# Patient Record
Sex: FEMALE | Race: WHITE
[De-identification: names, ages, dates, MRNs, and addresses within clinical notes are randomized per-mention and may not be internally consistent; named-entity substitution may affect disease eponyms.]

---

## 2019-03-04 ENCOUNTER — HOSPITAL ENCOUNTER (INPATIENT)
Dept: HOSPITAL 92 - ERS | Age: 84
LOS: 5 days | Discharge: NURSING, REHAB OR LONG-TERM CARE | DRG: 280 | End: 2019-03-09
Attending: INTERNAL MEDICINE | Admitting: INTERNAL MEDICINE
Payer: MEDICARE

## 2019-03-04 VITALS — BODY MASS INDEX: 35.7 KG/M2

## 2019-03-04 DIAGNOSIS — F32.9: ICD-10-CM

## 2019-03-04 DIAGNOSIS — E11.22: ICD-10-CM

## 2019-03-04 DIAGNOSIS — Z95.1: ICD-10-CM

## 2019-03-04 DIAGNOSIS — Z79.82: ICD-10-CM

## 2019-03-04 DIAGNOSIS — G40.909: ICD-10-CM

## 2019-03-04 DIAGNOSIS — Z79.84: ICD-10-CM

## 2019-03-04 DIAGNOSIS — I48.0: ICD-10-CM

## 2019-03-04 DIAGNOSIS — I25.10: ICD-10-CM

## 2019-03-04 DIAGNOSIS — Z79.01: ICD-10-CM

## 2019-03-04 DIAGNOSIS — H40.9: ICD-10-CM

## 2019-03-04 DIAGNOSIS — I13.0: Primary | ICD-10-CM

## 2019-03-04 DIAGNOSIS — Z88.2: ICD-10-CM

## 2019-03-04 DIAGNOSIS — E78.5: ICD-10-CM

## 2019-03-04 DIAGNOSIS — Z88.8: ICD-10-CM

## 2019-03-04 DIAGNOSIS — Z88.0: ICD-10-CM

## 2019-03-04 DIAGNOSIS — D63.1: ICD-10-CM

## 2019-03-04 DIAGNOSIS — N18.3: ICD-10-CM

## 2019-03-04 DIAGNOSIS — Z88.1: ICD-10-CM

## 2019-03-04 DIAGNOSIS — I21.A1: ICD-10-CM

## 2019-03-04 DIAGNOSIS — Z79.899: ICD-10-CM

## 2019-03-04 DIAGNOSIS — I50.33: ICD-10-CM

## 2019-03-04 LAB
ALBUMIN SERPL BCG-MCNC: 3.7 G/DL (ref 3.4–4.8)
ALP SERPL-CCNC: 104 U/L (ref 40–150)
ALT SERPL W P-5'-P-CCNC: 23 U/L (ref 8–55)
ANION GAP SERPL CALC-SCNC: 17 MMOL/L (ref 10–20)
AST SERPL-CCNC: 17 U/L (ref 5–34)
BASOPHILS # BLD AUTO: 0.1 THOU/UL (ref 0–0.2)
BASOPHILS NFR BLD AUTO: 0.9 % (ref 0–1)
BILIRUB SERPL-MCNC: 0.3 MG/DL (ref 0.2–1.2)
BUN SERPL-MCNC: 28 MG/DL (ref 9.8–20.1)
CALCIUM SERPL-MCNC: 8.8 MG/DL (ref 7.8–10.44)
CHLORIDE SERPL-SCNC: 107 MMOL/L (ref 98–107)
CK MB SERPL-MCNC: 1.5 NG/ML (ref 0–6.6)
CK MB SERPL-MCNC: 5.6 NG/ML (ref 0–6.6)
CK SERPL-CCNC: 13 U/L (ref 29–168)
CO2 SERPL-SCNC: 19 MMOL/L (ref 23–31)
CREAT CL PREDICTED SERPL C-G-VRATE: 0 ML/MIN (ref 70–130)
EOSINOPHIL # BLD AUTO: 0.3 THOU/UL (ref 0–0.7)
EOSINOPHIL NFR BLD AUTO: 3.5 % (ref 0–10)
GLOBULIN SER CALC-MCNC: 2.9 G/DL (ref 2.4–3.5)
GLUCOSE SERPL-MCNC: 174 MG/DL (ref 83–110)
HGB BLD-MCNC: 10.5 G/DL (ref 12–16)
LYMPHOCYTES # BLD: 2.8 THOU/UL (ref 1.2–3.4)
LYMPHOCYTES NFR BLD AUTO: 34.2 % (ref 21–51)
MCH RBC QN AUTO: 30.7 PG (ref 27–31)
MCV RBC AUTO: 97.1 FL (ref 78–98)
MONOCYTES # BLD AUTO: 0.4 THOU/UL (ref 0.11–0.59)
MONOCYTES NFR BLD AUTO: 4.6 % (ref 0–10)
NEUTROPHILS # BLD AUTO: 4.7 THOU/UL (ref 1.4–6.5)
NEUTROPHILS NFR BLD AUTO: 56.7 % (ref 42–75)
PLATELET # BLD AUTO: 205 THOU/UL (ref 130–400)
POTASSIUM SERPL-SCNC: 4.5 MMOL/L (ref 3.5–5.1)
RBC # BLD AUTO: 3.41 MILL/UL (ref 4.2–5.4)
SODIUM SERPL-SCNC: 138 MMOL/L (ref 136–145)
WBC # BLD AUTO: 8.3 THOU/UL (ref 4.8–10.8)

## 2019-03-04 PROCEDURE — 93306 TTE W/DOPPLER COMPLETE: CPT

## 2019-03-04 PROCEDURE — 82553 CREATINE MB FRACTION: CPT

## 2019-03-04 PROCEDURE — 94640 AIRWAY INHALATION TREATMENT: CPT

## 2019-03-04 PROCEDURE — 71045 X-RAY EXAM CHEST 1 VIEW: CPT

## 2019-03-04 PROCEDURE — 94760 N-INVAS EAR/PLS OXIMETRY 1: CPT

## 2019-03-04 PROCEDURE — 80048 BASIC METABOLIC PNL TOTAL CA: CPT

## 2019-03-04 PROCEDURE — 82550 ASSAY OF CK (CPK): CPT

## 2019-03-04 PROCEDURE — 84484 ASSAY OF TROPONIN QUANT: CPT

## 2019-03-04 PROCEDURE — 36415 COLL VENOUS BLD VENIPUNCTURE: CPT

## 2019-03-04 PROCEDURE — 85025 COMPLETE CBC W/AUTO DIFF WBC: CPT

## 2019-03-04 PROCEDURE — 93005 ELECTROCARDIOGRAM TRACING: CPT

## 2019-03-04 PROCEDURE — 96374 THER/PROPH/DIAG INJ IV PUSH: CPT

## 2019-03-04 PROCEDURE — 90670 PCV13 VACCINE IM: CPT

## 2019-03-04 PROCEDURE — 83880 ASSAY OF NATRIURETIC PEPTIDE: CPT

## 2019-03-04 PROCEDURE — 90471 IMMUNIZATION ADMIN: CPT

## 2019-03-04 PROCEDURE — G0009 ADMIN PNEUMOCOCCAL VACCINE: HCPCS

## 2019-03-04 PROCEDURE — 80053 COMPREHEN METABOLIC PANEL: CPT

## 2019-03-04 PROCEDURE — 36416 COLLJ CAPILLARY BLOOD SPEC: CPT

## 2019-03-04 NOTE — RAD
SINGLE VIEW CHEST:

 

Date:  03/04/19 

 

COMPARISON:  

02/07/19. 

 

HISTORY:  

Chest palpitations and productive cough. 

 

FINDINGS:

Single view of the chest shows an enlarged but stable cardiomediastinal silhouette. The patient is st
atus post sternotomy. There is no evidence of consolidation, mass, or pleural effusion. 

 

IMPRESSION: 

Cardiomegaly without evidence of acute cardiopulmonary disease. 

 

 

POS: [facility]

## 2019-03-05 LAB
ANION GAP SERPL CALC-SCNC: 13 MMOL/L (ref 10–20)
BASOPHILS # BLD AUTO: 0.1 THOU/UL (ref 0–0.2)
BASOPHILS NFR BLD AUTO: 1 % (ref 0–1)
BUN SERPL-MCNC: 26 MG/DL (ref 9.8–20.1)
CALCIUM SERPL-MCNC: 8.4 MG/DL (ref 7.8–10.44)
CHLORIDE SERPL-SCNC: 106 MMOL/L (ref 98–107)
CO2 SERPL-SCNC: 23 MMOL/L (ref 23–31)
CREAT CL PREDICTED SERPL C-G-VRATE: 38 ML/MIN (ref 70–130)
EOSINOPHIL # BLD AUTO: 0.2 THOU/UL (ref 0–0.7)
EOSINOPHIL NFR BLD AUTO: 2.7 % (ref 0–10)
GLUCOSE SERPL-MCNC: 155 MG/DL (ref 83–110)
HGB BLD-MCNC: 9.6 G/DL (ref 12–16)
LYMPHOCYTES # BLD: 1.3 THOU/UL (ref 1.2–3.4)
LYMPHOCYTES NFR BLD AUTO: 20.5 % (ref 21–51)
MCH RBC QN AUTO: 31 PG (ref 27–31)
MCV RBC AUTO: 95.1 FL (ref 78–98)
MONOCYTES # BLD AUTO: 0.4 THOU/UL (ref 0.11–0.59)
MONOCYTES NFR BLD AUTO: 6.1 % (ref 0–10)
NEUTROPHILS # BLD AUTO: 4.3 THOU/UL (ref 1.4–6.5)
NEUTROPHILS NFR BLD AUTO: 69.8 % (ref 42–75)
PLATELET # BLD AUTO: 176 THOU/UL (ref 130–400)
POTASSIUM SERPL-SCNC: 4.1 MMOL/L (ref 3.5–5.1)
RBC # BLD AUTO: 3.11 MILL/UL (ref 4.2–5.4)
SODIUM SERPL-SCNC: 138 MMOL/L (ref 136–145)
TROPONIN I SERPL DL<=0.01 NG/ML-MCNC: 4.28 NG/ML (ref ?–0.03)
WBC # BLD AUTO: 6.2 THOU/UL (ref 4.8–10.8)

## 2019-03-05 RX ADMIN — GUAIFENESIN AND DEXTROMETHORPHAN PRN ML: 100; 10 SYRUP ORAL at 15:33

## 2019-03-05 RX ADMIN — ASPIRIN SCH MG: 81 TABLET ORAL at 09:05

## 2019-03-05 RX ADMIN — DOCUSATE SODIUM 50 MG AND SENNOSIDES 8.6 MG PRN TAB: 8.6; 5 TABLET, FILM COATED ORAL at 00:50

## 2019-03-05 RX ADMIN — GUAIFENESIN AND DEXTROMETHORPHAN PRN ML: 100; 10 SYRUP ORAL at 08:29

## 2019-03-05 NOTE — PDOC.PN
- Subjective


Encounter Start Date: 03/05/19


Encounter Start Time: 11:00





Pt seen for followup re: diastolic CHF exacerbation.  Says she feels okay.





- Objective


Resuscitation Status - Order Detail:





03/04/19 16:34


Resuscitation Status Routine 


   Resuscitation Status: DNAR: NO Resuscitation


   Discussed with: patient








MAR Reviewed: Yes


Vital Signs & Weight: 


 Vital Signs (12 hours)











  Temp Pulse Pulse Pulse Resp BP BP


 


 03/05/19 15:37  97.1 F L  65    16  


 


 03/05/19 11:05  97.9 F     16  


 


 03/05/19 10:26    65  66   128/60  134/57 L


 


 03/05/19 10:25    65  66   128/60  134/57 L


 


 03/05/19 08:07       


 


 03/05/19 08:06   104 H    16  


 


 03/05/19 08:00  97.1 F L      














  BP Pulse Ox


 


 03/05/19 15:37  168/72 H  100


 


 03/05/19 11:05   98


 


 03/05/19 10:26  


 


 03/05/19 10:25  


 


 03/05/19 08:07   99


 


 03/05/19 08:06  


 


 03/05/19 08:00  








 Weight











Weight                         195 lb 8.8 oz














Result Diagrams: 


 03/05/19 04:58





 03/05/19 04:58


Additional Labs: 


 Accuchecks











  03/05/19





  16:44


 


POC Glucose  143 H











EKG Reviewed by me: Yes (Tele:  [facility])





Phys Exam





- Physical Examination


Obese


HEENT: moist MMs, sclera anicteric, oral pharynx no lesions, 2+ tonsils


Respiratory: wheezing present


Cardiovascular: RRR, no rub


S1, S2


Gastrointestinal: soft, non-tender, positive bowel sounds


distention


Musculoskeletal: edema present


Neurological: moves all 4 limbs


Psychiatric: normal affect, A&O x 3





Dx/Plan


(1) Acute on chronic diastolic CHF (congestive heart failure), NYHA class 3


Code(s): I50.33 - ACUTE ON CHRONIC DIASTOLIC (CONGESTIVE) HEART FAILURE   Status

: Acute   Comment: continue diuretics   





(2) Demand ischemia


Code(s): I24.8 - OTHER FORMS OF ACUTE ISCHEMIC HEART DISEASE   Status: Acute   

Comment: No chest pain, cardiology service consulted   





(3) DM2 (diabetes mellitus, type 2)


Status: Chronic   Comment: continue accuchecks, insulin sliding scale   





(4) HTN (hypertension)


Code(s): I10 - ESSENTIAL (PRIMARY) HYPERTENSION   Status: Chronic   


Qualifiers: 


   Hypertension type: essential hypertension   Qualified Code(s): I10 - 

Essential (primary) hypertension   


Comment: controlled   





(5) CKD stage 4 secondary to hypertension


Code(s): I12.9 - HYPERTENSIVE CHRONIC KIDNEY DISEASE W STG 1-4/UNSP CHR KDNY; 

N18.4 - CHRONIC KIDNEY DISEASE, STAGE 4 (SEVERE)   Status: Acute   





(6) CKD stage 4 due to type 2 diabetes mellitus


Code(s): E11.22 - TYPE 2 DIABETES MELLITUS W DIABETIC CHRONIC KIDNEY DISEASE; 

N18.4 - CHRONIC KIDNEY DISEASE, STAGE 4 (SEVERE)   Status: Chronic   Comment: 

now in CKD stage 3   





- Plan





* .








Review of Systems





- Review of Systems


Constitutional: negative: fever, chills, sweats, weakness, malaise


Respiratory: Cough, Sputum.  negative: Dry, Shortness of Breath, Hemoptysis, 

SOB with Excertion, Pleuritic Pain, Wheezing


Cardiovascular: negative: chest pain, palpitations, orthopnea, paroxysmal 

nocturnal dyspnea, edema, light headedness


Gastrointestinal: negative: Nausea, Vomiting, Abdominal Pain, Diarrhea, 

Constipation, Melena, Hematochezia


Genitourinary: negative: Dysuria, Frequency, Incontinence, Hematuria, Retention





- Medications/Allergies


Allergies/Adverse Reactions: 


 Allergies











Allergy/AdvReac Type Severity Reaction Status Date / Time


 


codeine Allergy Intermediate  Verified 03/05/19 06:56


 


Penicillins Allergy Intermediate  Verified 03/05/19 06:56


 


Sulfa (Sulfonamide Allergy Intermediate  Verified 03/05/19 06:56





Antibiotics)     


 


ciprofloxacin [From Cipro] Allergy   Verified 03/05/19 06:56


 


phenytoin [From Dilantin] Allergy   Verified 03/05/19 06:56


 


vancomycin Allergy  Hives Verified 03/05/19 06:56











Medications: 


 Current Medications





Acetaminophen (Tylenol)  650 mg PO Q4H PRN


   PRN Reason: Headache/Fever/Mild Pain (1-3)


Albuterol/Ipratropium (Duoneb)  3 ml NEB BID-RT [facility]


   Last Admin: 03/05/19 08:06 Dose:  3 ml


Aspirin (Ecotrin)  81 mg PO DAILY [facility]


   Last Admin: 03/05/19 09:05 Dose:  81 mg


Digoxin (Lanoxin)  0.125 mg PO DAILY [facility]


   Last Admin: 03/05/19 08:29 Dose:  0.125 mg


Dronedarone (Multaq)  400 mg PO BID-[facility]


   Last Admin: 03/05/19 08:29 Dose:  400 mg


Escitalopram Oxalate (Lexapro)  10 mg PO DAILY [facility]


   Last Admin: 03/05/19 08:29 Dose:  10 mg


Furosemide (Lasix)  40 mg SLOW IVP DAILY [facility]


   Last Admin: 03/05/19 08:30 Dose:  40 mg


Glyburide (Micronase)  2.5 mg PO QAM-[facility]


   Last Admin: 03/05/19 08:29 Dose:  2.5 mg


Guaifenesin/Dextromethorphan (Robitussin Dm)  15 ml PO Q4H PRN


   PRN Reason: Cough


   Last Admin: 03/05/19 15:33 Dose:  15 ml


Hydralazine HCl (Apresoline)  25 mg PO TID [facility]


   Last Admin: 03/05/19 15:31 Dose:  25 mg


Hydralazine HCl (Apresoline)  5 mg SLOW IVP Q4H PRN


   PRN Reason: SBP GREATER THAN 160


Labetalol HCl (Normodyne)  10 mg SLOW IVP Q4H PRN


   PRN Reason: SBP Greater Than 180


Melatonin (Melatonin)  3 mg PO HS PRN


   PRN Reason: Insomnia


Metoprolol Tartrate (Lopressor)  100 mg PO BID [facility]


   Last Admin: 03/05/19 08:28 Dose:  100 mg


Ondansetron HCl (Zofran)  4 mg IVP Q6H PRN


   PRN Reason: Nausea/Vomiting


Phenobarbital (Phenobarbital)  64.8 mg PO BID [facility]


   Last Admin: 03/05/19 10:03 Dose:  64.8 mg


Rosuvastatin Calcium (Crestor)  40 mg PO HS [facility]


   Last Admin: 03/04/19 21:55 Dose:  40 mg


Senna/Docusate Sodium (Senokot S)  2 tab PO BID PRN


   PRN Reason: Constipation


   Last Admin: 03/05/19 00:50 Dose:  2 tab

## 2019-03-05 NOTE — CON
DATE OF CONSULTATION:  



REASON FOR CONSULTATION:  Congestive heart failure.



PRIMARY CARDIOLOGIST:  Dr. Eric Nickerson.



HISTORY OF PRESENT ILLNESS:  Ms. Arthur is a pleasant 85-year-old woman, who has

been seen and evaluated by Dr. Ramez Hart and Dr. Eric Nickerson.  Primary

cardiologist is Dr. Ramez Hart, although the patient states it was Dr. Eric Nickerson. 



Ms. Arthur has a previous history of diastolic dysfunction.  She states she

recently presented with a mucus.  No shortness of breath present.  No chest pain or

other associated symptoms noted. 



PAST MEDICAL HISTORY:  

1. Diabetes mellitus.

2. Hyperlipidemia.

3. Hypertension.

4. CAD.

5. Previous seizure disorder.

6. Brain abscess.



HOME MEDICATIONS:  Include;

1. Benicar.

2. Plavix.

3. Aspirin.

4. Crestor.

5. Metoprolol.

6. Phenobarbital.

7. Metformin.

8. Glipizide.

9. Januvia.

10. Myrbetriq.

11. Lasix.



ALLERGIES:  CODEINE, CIPRO, DILANTIN, TEGRETOL, VANCOMYCIN.



SOCIAL HISTORY:  No current tobacco or alcohol use.



REVIEW OF SYSTEMS:  A 10-point review of systems is reviewed as above, otherwise

negative. 



PHYSICAL EXAMINATION:

VITAL SIGNS:  Blood pressure 160/72, pulse 65, temperature 97.1. 

GENERAL:  Patient is a pleasant 85-year-old who is in no acute distress.  The

patient appears their stated age. 

NEUROLOGIC:  The patient is alert and oriented x3 with no focal neurologic deficits. 

HEENT:  Sclerae without icterus.  Mouth has moist mucous membranes with normal

pallor. 

NECK:  No JVD.  Carotid upstroke brisk.  No bruits bilaterally. 

LUNGS:  Clear to auscultation with unlabored respirations. 

BACK:  No scoliosis or kyphosis. 

CARDIAC:  Regular rate and rhythm with normal S1 and S2.  No S3 or S4 noted.  No

significant rubs, murmurs, thrills, or gallops noted throughout the precordium.  PMI

is not displaced.  There is no parasternal heave. 

ABDOMEN:  Soft, nontender, nondistended.  No peritoneal signs present.  No

hepatosplenomegaly.  No abnormal striae. 

EXTREMITIES:  2+ femoral and 2+ dorsalis pedis pulses.  No cyanosis, clubbing, or

edema. 

SKIN:  No gross abnormalities.



PERTINENT LABORATORY DATA:  Hemoglobin 9.6, white blood cell count 6.2.  Peak

troponin 4.2.  Creatinine 1.53 with a GFR of 32.  BNP of 903. 



Chest x-ray, cardiomegaly.



IMPRESSION:  

1. Elevated troponin.

2. Bronchitis versus pneumonia.

3. Diastolic dysfunction.

4. Coronary artery disease, status post bypass surgery.



RECOMMENDATIONS:  It is a certainly difficult situation for Ms. Arthur.  She does

have a previous history of CAD, status post bypass surgery and elevated troponin,

although no current symptoms suggesting angina.  At this point, I would recommend

medical therapy.  Her creatinine is elevated.  We will add Lovenox x1 dose.  We will

recommend echo with Doppler.  May need cardiac disposition prior to discharge, given

elevated troponin.  Further recommendations per Dr. Ramez Hart in a.m. 







Job ID:  [id number]

## 2019-03-05 NOTE — HP
PRIMARY CARE PHYSICIAN:  Crispin Powell MD.



CHIEF COMPLAINT:  "I woke up spitting up phlegm."



HISTORY OF PRESENT ILLNESS:  Mrs. Arthur is a delightful 85-year-old female,

familiar to the hospitalist service from a recent hospital stay 2019 
through

2019, at which time she was admitted with bilateral pneumonia, physical

deconditioning, and found to have evidence of atrial tachyarrhythmias.  She was

ultimately discharged on anticoagulation with Eliquis, as well as rate/rhythm

control with digoxin, Multaq, and metoprolol.  She has been on the low dose 
diuretic

regimen at [facility], torsemide 5 mg p.o. twice daily.

She presents on this occasion with interval worsening of phlegm production.  In

speaking with the patient, and later with her daughter, Zafar Bowman, her blood

pressure has been running high throughout the week.  Additionally, she has been

having increasing lower extremity edema.  She denies any fever, chills, chest 
pain,

or chest pressure.  She completed her antibiotic for pneumonia as prescribed 
upon

last hospital discharge.  Her blood sugars have been under fair control.  In the

emergency department, she received 20 mg of IV Lasix, as well as a DuoNeb.  At 
the

time of my evaluation, several hours later, she reports good urine output, with

continued phlegm production.  She tends to have more difficulties when lying 
flat.

Additionally, cardiac abnormality regarding laboratory work noted, with cardiac

biomarkers abnormal on third draw, troponin I of 1.150.  The patient seen and

evaluated in the emergency department, at the time of my evaluation, she is 
holding for

in-hospital placement. 



During her previous hospital stay, seen and evaluated by Dr. Nickerson from

Cardiology in regard to paroxysmal atrial fibrillation, atrial tachycardia.

Ultimately returned to sinus rhythm, with ongoing administration of Multaq,

beta-blocker, digoxin.  Additionally, discharged to [facility] on

Eliquis 2.5 mg p.o. twice daily. 



The patient denies any nausea or vomiting.  Describes her appetite is "decent."

Denies feeling short of breath.  She does not ambulate, transfers to the 
wheelchair

with assistance for mobility. 



PAST MEDICAL HISTORY:  

1. Hypertension.

2. Type 2 diabetes.

3. Dyslipidemia.

4. Coronary artery disease status post previous coronary artery bypass grafting

greater than 10 years ago. 

5. History of seizure disorder, stable on phenobarbital twice daily.

6. Glaucoma.

7. Recent hospital stay for pneumonia, 2019.

8. Diastolic congestive heart failure, with ejection fraction of 50% to 55%, 
based

on echocardiogram, 2019. 

9. Atrial fibrillation, paroxysmal, noted during in-hospital stay, 2019.

10. Depression.



ALLERGIES:  CIPROFLOXACIN, CODEINE, SULFA ANTIBIOTICS, PENICILLIN, DILANTIN,

TEGRETOL, AND VANCOMYCIN. 



CURRENT MEDICATIONS:  

1. Aspirin 81 mg p.o. once daily.

2. Digoxin 0.125 mg p.o. once daily.

3. Glyburide 2.5 mg p.o. once daily.

4. Lexapro 10 mg p.o. once daily.

5. Dronedarone 400 mg p.o. twice daily.

6. Eliquis 2.5 mg p.o. twice daily.

7. Metoprolol tartrate 100 mg p.o. b.i.d.

8. Phenobarbital 64.8 mg p.o. b.i.d.

9. Torsemide 5 mg p.o. b.i.d.

10. Hydralazine 25 mg p.o. t.i.d.

11. Crestor 40 mg p.o. at bedtime.

12. Fesoterodine 8 mg p.o. at bedtime.

13. Melatonin 3 mg p.o. at bedtime.



FAMILY HISTORY:  Father and grandfather  with diabetes and hypertension.



SOCIAL HISTORY:  The patient is .  She has been staying at [facility] since her last hospital discharge.  She is a lifelong nonsmoker, does 
not

use alcohol.  She has previously worked in the [employer].  I spoke with her about code status, she confirms do not attempt

resuscitation status.  Her daughter confirms the same. 



REVIEW OF SYSTEMS:  Full review of systems reviewed, addressed, negative except

otherwise as mentioned. 



PHYSICAL EXAMINATION:

VITAL SIGNS:  Blood pressure 170/74, heart rate 91, respiratory rate 18, 
temperature

97.7. 

GENERAL:  Frail, elderly, pleasant female, sitting up in bed.  Able to give a 
fair

history. 

HEENT:  Pupils are reactive to light bilaterally.  No conjunctival injection.  
No

scleral icterus.  Extraocular movements are intact.  Oropharynx, mucous 
membranes

are moist, no thrush present. 

NECK:  Supple.  Full range of motion.  No jugular venous distention. 

CHEST:  Crackles in the lower lobes bilaterally, upper fields with some 
occasional

expiratory wheezing. 

HEART:  Regular rate and rhythm, without distinct murmur, rub, or gallop. 

ABDOMEN:  Soft, nontender, nondistended without rebound or guarding. 

EXTREMITIES:  Edema is present, 2+ bilateral lower extremities.  Vascular 
palpable

dorsalis pedis and posterior tibial pulses 1+ bilaterally. 

SKIN:  Without erythema/exudate/rash. 

NEUROLOGIC:  She is able to move all extremities bilaterally to command.  No 
focal

neurological deficit. 



LABORATORY/DATA REVIEW:  Chest x-ray personally reviewed, interpretation also

reviewed.  Mild cardiomegaly, with question of overlying pulmonary vascular

congestion present, personally reviewed.  EKG personally reviewed, normal sinus

rhythm, 99 beats per minute.  Some lateral T-wave changes present noted. 



Bicarbonate is 19, BUN 28, creatinine 1.6, glucose 174.  CK-MB is normal.  
Troponin

1.150 on third set cardiac biomarkers.  BNP elevated at 903.  White blood cell 
count

normal at 8.3, hemoglobin 10.5, hematocrit 33.1, platelet count of 205.  Case

discussed by phone with Dr. Hart, on-call for Cardiology.  Management discussed,

request made for consultation, 2019, with Dr. Nickerson, who took care of 
the

patient during her last hospital stay. 



ADMITTING DIAGNOSES:  

1. Decompensated diastolic congestive heart failure in the context of recent

pneumonia, persistent hypertension, fluid retention. 

2. Recent history of bilateral pneumonia, status post completion of antibiotic

therapy, without evidence of inflammatory process at this time. 

3. Elevated troponin, compatible with demand ischemia versus non-ST elevation

myocardial infarction. 

4. Type 2 diabetes.

5. Essential hypertension, suboptimal control 

6.  History of coronary artery disease, prior CABG.

7. Seizure disorder, presently stable.

8. Chronic kidney disease, stage 3.



PLAN/RECOMMENDATIONS:  

1. Cardiology - additional troponin ordered for the morning.  Afterload 
reduction,

to include her home metoprolol tartrate 100 mg p.o. twice per day, as well as

hydralazine 25 mg p.o. three times daily.  We will also provide p.r.n. afterload

reduction pending her response to therapy.  Diuretics provided in the emergency

department, Lasix 40 mg IV daily ordered, beginning tomorrow.  Check daily 
weights,

follow clinically.  Cardiology consultation with Dr. Nickerson, for 2019.  
The

patient is presently in normal sinus rhythm, in contrast her most recent 
hospital

stay during which time atrial tachyarrhythmias were prominent.  Continue digoxin
,

Dronedarone, as well as full anticoagulation with Eliquis.  Continuation of home

aspirin in the context of previous coronary artery disease.  We will continue 
home

Crestor.  The patient may require higher maintenance dose of diuretic, has been 
on

torsemide 5 mg p.o. twice daily. 

2. Infectious Disease - no indication for antibiotics at this time.

3. Deep venous thrombosis prophylaxis - the patient fully anticoagulated with

Eliquis. 

4. Endocrine - Accu-Cheks/sliding scale/glyburide per home list.

5. Code status - do not attempt resuscitation.

6. Depression - continue Lexapro 10 mg p.o. once daily.

7. Neurology - seizure disorder - stable, continue phenobarbital 64.8 mg p.o. 
twice 

daily. 

Given her age and comorbidities, she is high risk.  I spoke with her daughter 
at the 

bedside in the emergency department, explained the reason for her admission, 
questions answered.  No futher issues at the end of our interview.







Job ID:  [id number]



MTDD

## 2019-03-06 LAB
ANION GAP SERPL CALC-SCNC: 14 MMOL/L (ref 10–20)
BASOPHILS # BLD AUTO: 0 THOU/UL (ref 0–0.2)
BASOPHILS NFR BLD AUTO: 0.6 % (ref 0–1)
BUN SERPL-MCNC: 27 MG/DL (ref 9.8–20.1)
CALCIUM SERPL-MCNC: 8.3 MG/DL (ref 7.8–10.44)
CHLORIDE SERPL-SCNC: 105 MMOL/L (ref 98–107)
CO2 SERPL-SCNC: 22 MMOL/L (ref 23–31)
CREAT CL PREDICTED SERPL C-G-VRATE: 33 ML/MIN (ref 70–130)
EOSINOPHIL # BLD AUTO: 0.3 THOU/UL (ref 0–0.7)
EOSINOPHIL NFR BLD AUTO: 4.7 % (ref 0–10)
GLUCOSE SERPL-MCNC: 198 MG/DL (ref 83–110)
HGB BLD-MCNC: 9.8 G/DL (ref 12–16)
LYMPHOCYTES # BLD: 1.8 THOU/UL (ref 1.2–3.4)
LYMPHOCYTES NFR BLD AUTO: 27.8 % (ref 21–51)
MCH RBC QN AUTO: 30.7 PG (ref 27–31)
MCV RBC AUTO: 94.5 FL (ref 78–98)
MONOCYTES # BLD AUTO: 0.4 THOU/UL (ref 0.11–0.59)
MONOCYTES NFR BLD AUTO: 6.9 % (ref 0–10)
NEUTROPHILS # BLD AUTO: 3.8 THOU/UL (ref 1.4–6.5)
NEUTROPHILS NFR BLD AUTO: 60 % (ref 42–75)
PLATELET # BLD AUTO: 172 THOU/UL (ref 130–400)
POTASSIUM SERPL-SCNC: 4.1 MMOL/L (ref 3.5–5.1)
RBC # BLD AUTO: 3.18 MILL/UL (ref 4.2–5.4)
SODIUM SERPL-SCNC: 137 MMOL/L (ref 136–145)
WBC # BLD AUTO: 6.3 THOU/UL (ref 4.8–10.8)

## 2019-03-06 RX ADMIN — ASPIRIN SCH MG: 81 TABLET ORAL at 09:32

## 2019-03-06 RX ADMIN — GUAIFENESIN AND DEXTROMETHORPHAN PRN ML: 100; 10 SYRUP ORAL at 21:16

## 2019-03-06 NOTE — PDOC.CTH
Cardiology Progress Note





- Subjective





The pt seen and examined.  No overnight events.  No cardiac complaints.  She is 

on 1.5 LNC.  She denied SOB. 





- Objective


 Vital Signs











  Temp Pulse Pulse Pulse Resp BP BP


 


 03/06/19 16:49   64     161/70 H 


 


 03/06/19 16:00  97.4 F L  64    16  


 


 03/06/19 13:52    63  66    170/74 H


 


 03/06/19 12:00  96.1 F L  66    16  


 


 03/06/19 09:34   83     140/75 


 


 03/06/19 09:32   83     


 


 03/06/19 08:00  98.5 F  73    18  


 


 03/06/19 07:11   68    16  














  BP BP BP Pulse Ox


 


 03/06/19 16:49    


 


 03/06/19 16:00   161/70 H   95


 


 03/06/19 13:52  160/70 H   


 


 03/06/19 12:00    177/79 H  94 L


 


 03/06/19 09:34    


 


 03/06/19 09:32    


 


 03/06/19 08:00   140/75   98


 


 03/06/19 07:11     96








 











Weight                         192 lb 8 oz














 











 03/05/19 03/06/19 03/07/19





 06:59 06:59 06:59


 


Intake Total  1504 


 


Output Total  220 


 


Balance  1284 














- Physical Examination


General/Neuro: alert & oriented x3


Neck: no JVD present


Lungs: other: (diminished at bases)


Heart: RRR


Abdomen: soft


Extremities: other: (no edema)





- Telemetry


Telemetry Rhythm: SR





- Labs


Result Diagrams: 


 03/06/19 08:55





 03/06/19 08:55


 Troponin/CKMB











CK-MB (CK-2)  5.6 ng/mL (0-6.6)   03/04/19  12:27    


 


Troponin I  4.284 ng/mL (< 0.028)  H*  03/05/19  04:58    














- Assessment/Plan





1. Acute on chronic diastolic HF - stable with Lasix 40mg IV daily; on 

Bblocker. Not on ACE/ARB 2/2 hx of CKD.


2. Elevated trop - possible 2/2 demand ischemia; Echo is ordered; Asymptomatic


3. Parox Afib - remains in SR; on Multaq and BBlocker; resume Eliquis 2.5mg BID 

from tonight.


4. HTN - start Norvasc 5mg from this PM


5. 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

, and statin


6. CKD stage 4 - cont. to monitor


7. DM type 2 - 


8. Anemia - unchanged


9. hx of seizure disorder - 


MAR reviewed





pt. seen and patrick;l. by me. I agree with the A/P by the NP. She is comfortable 

and denies SOB or chest pain. The cardiac enzymes have increased. I spoke to 

the daughter and we agree that even if this is a small NSTEMI that we do not 

plan to proceed with a cardiac cath . She is asymptomatic at the present. If 

the BP remains elevated then I will add nitrates.





Review of Systems





- Review of Systems


Constitutional: reports: weakness


EENTM: reports: no symptoms reported


Respiratory: reports: no symptoms reported


Cardiac (ROS): reports: no symptoms reported


ABD/GI: reports: no symptoms reported


: reports: no symptoms reported


Musculoskeletal: reports: no symptoms reported

## 2019-03-06 NOTE — PDOC.PN
- Subjective


Encounter Start Date: 03/06/19


Encounter Start Time: 08:20





Pt seen for followup re:  diastolic CHF exacerbation.  Says she feels better.





- Objective


Resuscitation Status - Order Detail:





03/04/19 16:34


Resuscitation Status Routine 


   Resuscitation Status: DNAR: NO Resuscitation


   Discussed with: patient








MAR Reviewed: Yes


Vital Signs & Weight: 


 Vital Signs (12 hours)











  Temp Pulse Pulse Pulse Resp BP BP


 


 03/06/19 13:52    63  66    170/74 H


 


 03/06/19 09:34   83     140/75 


 


 03/06/19 09:32   83     


 


 03/06/19 08:00  98.5 F  73    18  


 


 03/06/19 07:11   68    16  














  BP BP Pulse Ox


 


 03/06/19 13:52  160/70 H  


 


 03/06/19 09:34   


 


 03/06/19 09:32   


 


 03/06/19 08:00   140/75  98


 


 03/06/19 07:11    96








 Weight











Weight                         192 lb 8 oz














I&O: 


 











 03/05/19 03/06/19 03/07/19





 06:59 06:59 06:59


 


Intake Total  1504 


 


Output Total  220 


 


Balance  1284 











Result Diagrams: 


 03/06/19 08:55





 03/06/19 08:55


Additional Labs: 


 Accuchecks











  03/06/19 03/06/19 03/05/19





  11:01 05:56 20:13


 


POC Glucose  185 H  145 H  175 H














  03/05/19





  16:44


 


POC Glucose  143 H











EKG Reviewed by me: Yes (Tele:  NSR)





Phys Exam





- Physical Examination


Obese


HEENT: moist MMs, sclera anicteric, oral pharynx no lesions, 2+ tonsils


Neck: no nodes, no JVD, supple, full ROM


Jose crackles


Cardiovascular: RRR, no rub


S1, S2


Gastrointestinal: soft, non-tender, positive bowel sounds


distended


Musculoskeletal: edema present


Neurological: moves all 4 limbs


Psychiatric: normal affect





Dx/Plan


(1) Acute on chronic diastolic CHF (congestive heart failure), NYHA class 3


Code(s): I50.33 - ACUTE ON CHRONIC DIASTOLIC (CONGESTIVE) HEART FAILURE   Status

: Acute   Comment: Improving, continue IV furosemide   





(2) Demand ischemia


Code(s): I24.8 - OTHER FORMS OF ACUTE ISCHEMIC HEART DISEASE   Status: Acute   

Comment: appreciate cardiology service input.  Check 2D echo.   





(3) DM2 (diabetes mellitus, type 2)


Status: Chronic   Comment: Improved control   





(4) HTN (hypertension)


Code(s): I10 - ESSENTIAL (PRIMARY) HYPERTENSION   Status: Chronic   


Qualifiers: 


   Hypertension type: essential hypertension   Qualified Code(s): I10 - 

Essential (primary) hypertension   


Comment: controlled   





(5) CKD stage 4 due to type 2 diabetes mellitus


Code(s): E11.22 - TYPE 2 DIABETES MELLITUS W DIABETIC CHRONIC KIDNEY DISEASE; 

N18.4 - CHRONIC KIDNEY DISEASE, STAGE 4 (SEVERE)   Status: Chronic   Comment: 

now in CKD stage 3   





- Plan





* .








Review of Systems





- Review of Systems


Constitutional: negative: fever, chills, sweats, weakness, malaise


Respiratory: Cough, SOB with Excertion, Sputum.  negative: Dry, Shortness of 

Breath, Hemoptysis, Pleuritic Pain, Wheezing


Cardiovascular: edema.  negative: chest pain, palpitations, orthopnea, 

paroxysmal nocturnal dyspnea, light headedness


Gastrointestinal: negative: Nausea, Vomiting, Abdominal Pain, Diarrhea, 

Constipation, Melena, Hematochezia


Genitourinary: negative: Dysuria, Frequency, Incontinence, Hematuria, Retention





- Medications/Allergies


Allergies/Adverse Reactions: 


 Allergies











Allergy/AdvReac Type Severity Reaction Status Date / Time


 


codeine Allergy Intermediate  Verified 03/05/19 06:56


 


Penicillins Allergy Intermediate  Verified 03/05/19 06:56


 


Sulfa (Sulfonamide Allergy Intermediate  Verified 03/05/19 06:56





Antibiotics)     


 


ciprofloxacin [From Cipro] Allergy   Verified 03/05/19 06:56


 


phenytoin [From Dilantin] Allergy   Verified 03/05/19 06:56


 


vancomycin Allergy  Hives Verified 03/05/19 06:56











Medications: 


 Current Medications





Acetaminophen (Tylenol)  650 mg PO Q4H PRN


   PRN Reason: Headache/Fever/Mild Pain (1-3)


Albuterol/Ipratropium (Duoneb)  3 ml NEB BID-[facility]


   Last Admin: 03/06/19 07:11 Dose:  3 ml


Aspirin (Ecotrin)  81 mg PO DAILY [facility]


   Last Admin: 03/06/19 09:32 Dose:  81 mg


Digoxin (Lanoxin)  0.125 mg PO DAILY [facility]


   Last Admin: 03/06/19 09:32 Dose:  0.125 mg


Diphenhydramine HCl (Benadryl)  25 mg PO Q6H PRN


   PRN Reason: Itching


Dronedarone (Multaq)  400 mg PO BID-[facility]


   Last Admin: 03/06/19 09:32 Dose:  400 mg


Escitalopram Oxalate (Lexapro)  10 mg PO DAILY [facility]


   Last Admin: 03/06/19 09:32 Dose:  10 mg


Furosemide (Lasix)  40 mg SLOW IVP DAILY [facility]


   Last Admin: 03/06/19 09:34 Dose:  40 mg


Glyburide (Micronase)  2.5 mg PO QAM-[facility]


   Last Admin: 03/06/19 09:32 Dose:  2.5 mg


Guaifenesin/Dextromethorphan (Robitussin Dm)  15 ml PO Q4H PRN


   PRN Reason: Cough


   Last Admin: 03/05/19 15:33 Dose:  15 ml


Hydralazine HCl (Apresoline)  25 mg PO TID [facility]


   Last Admin: 03/06/19 09:34 Dose:  25 mg


Hydralazine HCl (Apresoline)  5 mg SLOW IVP Q4H PRN


   PRN Reason: SBP GREATER THAN 160


Labetalol HCl (Normodyne)  10 mg SLOW IVP Q4H PRN


   PRN Reason: SBP Greater Than 180


Latanoprost (Xalatan 0.005% Ophth Soln)  1 drop EA EYE [facility]


Melatonin (Melatonin)  3 mg PO HS PRN


   PRN Reason: Insomnia


Metoprolol Tartrate (Lopressor)  100 mg PO BID [facility]


   Last Admin: 03/06/19 09:32 Dose:  100 mg


Ondansetron HCl (Zofran)  4 mg IVP Q6H PRN


   PRN Reason: Nausea/Vomiting


Phenobarbital (Phenobarbital)  64.8 mg PO BID [facility]


   Last Admin: 03/06/19 09:38 Dose:  64.8 mg


Rosuvastatin Calcium (Crestor)  40 mg PO HS [facility]


   Last Admin: 03/05/19 21:26 Dose:  40 mg


Senna/Docusate Sodium (Senokot S)  2 tab PO BID PRN


   PRN Reason: Constipation


   Last Admin: 03/05/19 00:50 Dose:  2 tab

## 2019-03-07 LAB
ANION GAP SERPL CALC-SCNC: 13 MMOL/L (ref 10–20)
BASOPHILS # BLD AUTO: 0 THOU/UL (ref 0–0.2)
BASOPHILS NFR BLD AUTO: 0.7 % (ref 0–1)
BUN SERPL-MCNC: 30 MG/DL (ref 9.8–20.1)
CALCIUM SERPL-MCNC: 8 MG/DL (ref 7.8–10.44)
CHLORIDE SERPL-SCNC: 104 MMOL/L (ref 98–107)
CO2 SERPL-SCNC: 23 MMOL/L (ref 23–31)
COMM CRITICAL RESULTS DOC: (no result)
CREAT CL PREDICTED SERPL C-G-VRATE: 34 ML/MIN (ref 70–130)
EOSINOPHIL # BLD AUTO: 0.5 THOU/UL (ref 0–0.7)
EOSINOPHIL NFR BLD AUTO: 8.4 % (ref 0–10)
GLUCOSE SERPL-MCNC: 137 MG/DL (ref 83–110)
HGB BLD-MCNC: 9.9 G/DL (ref 12–16)
LYMPHOCYTES # BLD: 1.4 THOU/UL (ref 1.2–3.4)
LYMPHOCYTES NFR BLD AUTO: 24 % (ref 21–51)
MCH RBC QN AUTO: 31.3 PG (ref 27–31)
MCV RBC AUTO: 97.1 FL (ref 78–98)
MONOCYTES # BLD AUTO: 0.4 THOU/UL (ref 0.11–0.59)
MONOCYTES NFR BLD AUTO: 7.1 % (ref 0–10)
NEUTROPHILS # BLD AUTO: 3.6 THOU/UL (ref 1.4–6.5)
NEUTROPHILS NFR BLD AUTO: 59.8 % (ref 42–75)
PLATELET # BLD AUTO: 162 THOU/UL (ref 130–400)
POTASSIUM SERPL-SCNC: 4.1 MMOL/L (ref 3.5–5.1)
RBC # BLD AUTO: 3.14 MILL/UL (ref 4.2–5.4)
SODIUM SERPL-SCNC: 136 MMOL/L (ref 136–145)
TROPONIN I SERPL DL<=0.01 NG/ML-MCNC: 1.14 NG/ML (ref ?–0.03)
WBC # BLD AUTO: 5.9 THOU/UL (ref 4.8–10.8)

## 2019-03-07 RX ADMIN — GUAIFENESIN AND DEXTROMETHORPHAN PRN ML: 100; 10 SYRUP ORAL at 08:45

## 2019-03-07 RX ADMIN — GUAIFENESIN AND DEXTROMETHORPHAN PRN ML: 100; 10 SYRUP ORAL at 22:19

## 2019-03-07 RX ADMIN — ASPIRIN SCH MG: 81 TABLET ORAL at 08:44

## 2019-03-07 RX ADMIN — Medication SCH ML: at 22:13

## 2019-03-07 NOTE — PDOC.PN
- Subjective


Encounter Start Date: 03/07/19


Encounter Start Time: 07:20





Pt seen for followup re: CHF exacerbation.  Feels better.





- Objective


Resuscitation Status - Order Detail:





03/04/19 16:34


Resuscitation Status Routine 


   Resuscitation Status: DNAR: NO Resuscitation


   Discussed with: patient








MAR Reviewed: Yes


Vital Signs & Weight: 


 Vital Signs (12 hours)











  Temp Pulse Resp BP BP Pulse Ox


 


 03/07/19 11:35  98.2 F  60  16   146/67 H  94 L


 


 03/07/19 08:44   86    


 


 03/07/19 08:42  98.2 F  72  18  169/77 H   94 L


 


 03/07/19 08:15       95


 


 03/07/19 08:11   75  16    95


 


 03/07/19 04:20  99.1 F  64  18  157/70 H   96








 Weight











Weight                         191 lb 11.2 oz














I&O: 


 











 03/06/19 03/07/19 03/08/19





 06:59 06:59 06:59


 


Intake Total 1504 870 


 


Output Total 220 2100 


 


Balance 1284 -1230 











Result Diagrams: 


 03/07/19 05:48





 03/07/19 05:48


Additional Labs: 


 Accuchecks











  03/07/19 03/07/19 03/06/19





  10:48 06:13 20:30


 


POC Glucose  248 H  168 H  193 H














  03/06/19





  17:03


 


POC Glucose  132 H











EKG Reviewed by me: Yes (Tele:  NSR)





Phys Exam





- Physical Examination


Obese


HEENT: moist MMs


Neck: supple


Respiratory: clear to auscultation bilateral


Cardiovascular: RRR


Gastrointestinal: soft


Musculoskeletal: edema present


Neurological: moves all 4 limbs


Psychiatric: normal affect





Dx/Plan


(1) Acute on chronic diastolic CHF (congestive heart failure), NYHA class 3


Code(s): I50.33 - ACUTE ON CHRONIC DIASTOLIC (CONGESTIVE) HEART FAILURE   Status

: Acute   Comment: Improving   





(2) Demand ischemia


Code(s): I24.8 - OTHER FORMS OF ACUTE ISCHEMIC HEART DISEASE   Status: Acute   

Comment: no plan for cath   





(3) DM2 (diabetes mellitus, type 2)


Status: Chronic   Comment: continue accuchecks, insulin sliding scale   





(4) HTN (hypertension)


Code(s): I10 - ESSENTIAL (PRIMARY) HYPERTENSION   Status: Chronic   


Qualifiers: 


   Hypertension type: essential hypertension   Qualified Code(s): I10 - 

Essential (primary) hypertension   


Comment: controlled   





(5) CKD stage 4 due to type 2 diabetes mellitus


Code(s): E11.22 - TYPE 2 DIABETES MELLITUS W DIABETIC CHRONIC KIDNEY DISEASE; 

N18.4 - CHRONIC KIDNEY DISEASE, STAGE 4 (SEVERE)   Status: Chronic   Comment: 

stable   





- Plan





* .








Review of Systems





- Review of Systems


Respiratory: Cough, Dry, SOB with Excertion.  negative: Shortness of Breath, 

Pleuritic Pain, Wheezing


Cardiovascular: edema.  negative: chest pain, palpitations, orthopnea, 

paroxysmal nocturnal dyspnea, light headedness, other





- Medications/Allergies


Allergies/Adverse Reactions: 


 Allergies











Allergy/AdvReac Type Severity Reaction Status Date / Time


 


codeine Allergy Intermediate  Verified 03/05/19 06:56


 


Penicillins Allergy Intermediate  Verified 03/05/19 06:56


 


Sulfa (Sulfonamide Allergy Intermediate  Verified 03/05/19 06:56





Antibiotics)     


 


ciprofloxacin [From Cipro] Allergy   Verified 03/05/19 06:56


 


phenytoin [From Dilantin] Allergy   Verified 03/05/19 06:56


 


vancomycin Allergy  Hives Verified 03/05/19 06:56











Medications: 


 Current Medications





Acetaminophen (Tylenol)  650 mg PO Q4H PRN


   PRN Reason: Headache/Fever/Mild Pain (1-3)


Albuterol/Ipratropium (Duoneb)  3 ml NEB BID-RT [facility]


   Last Admin: 03/07/19 08:11 Dose:  3 ml


Amlodipine Besylate (Norvasc)  5 mg PO DAILY [facility]


   Last Admin: 03/07/19 08:44 Dose:  5 mg


Apixaban (Eliquis)  2.5 mg PO BID [facility]


   Last Admin: 03/07/19 08:44 Dose:  2.5 mg


Aspirin (Ecotrin)  81 mg PO DAILY [facility]


   Last Admin: 03/07/19 08:44 Dose:  81 mg


Diphenhydramine HCl (Benadryl)  25 mg PO Q6H PRN


   PRN Reason: Itching


   Last Admin: 03/06/19 21:23 Dose:  25 mg


Dronedarone (Multaq)  400 mg PO BID-[facility]


   Last Admin: 03/07/19 08:44 Dose:  400 mg


Escitalopram Oxalate (Lexapro)  10 mg PO DAILY [facility]


   Last Admin: 03/07/19 08:44 Dose:  10 mg


Furosemide (Lasix)  40 mg SLOW IVP DAILY [facility]


   Last Admin: 03/07/19 08:54 Dose:  40 mg


Glyburide (Micronase)  2.5 mg PO QAM-[facility]


   Last Admin: 03/07/19 08:44 Dose:  2.5 mg


Guaifenesin/Dextromethorphan (Robitussin Dm)  15 ml PO Q4H PRN


   PRN Reason: Cough


   Last Admin: 03/07/19 08:45 Dose:  15 ml


Hydralazine HCl (Apresoline)  5 mg SLOW IVP Q4H PRN


   PRN Reason: SBP GREATER THAN 160


Hydralazine HCl (Apresoline)  50 mg PO TID [facility]


Isosorbide Dinitrate (Isordil)  10 mg PO BID [facility]


   Last Admin: 03/07/19 08:44 Dose:  10 mg


Labetalol HCl (Normodyne)  10 mg SLOW IVP Q4H PRN


   PRN Reason: SBP Greater Than 180


Latanoprost (Xalatan 0.005% Ophth Soln)  1 drop EA EYE HS [facility]


   Last Admin: 03/06/19 21:24 Dose:  1 drop


Melatonin (Melatonin)  3 mg PO HS PRN


   PRN Reason: Insomnia


Metoprolol Tartrate (Lopressor)  100 mg PO BID [facility]


   Last Admin: 03/07/19 08:45 Dose:  100 mg


Ondansetron HCl (Zofran)  4 mg IVP Q6H PRN


   PRN Reason: Nausea/Vomiting


Phenobarbital (Phenobarbital)  64.8 mg PO BID [facility]


   Last Admin: 03/07/19 09:58 Dose:  64.8 mg


Rosuvastatin Calcium (Crestor)  40 mg PO HS [facility]


   Last Admin: 03/06/19 21:21 Dose:  40 mg


Senna/Docusate Sodium (Senokot S)  2 tab PO BID PRN


   PRN Reason: Constipation


   Last Admin: 03/05/19 00:50 Dose:  2 tab


Sodium Chloride (Flush - Normal Saline)  10 ml IVF Q12HR [facility]


Sodium Chloride (Flush - Normal Saline)  10 ml IVF PRN PRN


   PRN Reason: Saline Flush

## 2019-03-07 NOTE — PDOC.CTH
Cardiology Progress Note





- Subjective





The pt seen and examined.  No overnight events.  No cardiac complaints. She 

coughs with white/clear phlegm.    





- Objective


 Vital Signs











  Temp Pulse Resp BP Pulse Ox


 


 03/07/19 08:44   86   


 


 03/07/19 08:42  98.2 F  72  18  169/77 H  96


 


 03/07/19 08:15      95


 


 03/07/19 08:11   75  16   95


 


 03/07/19 04:20  99.1 F  64  18  157/70 H  96








 











Weight                         191 lb 11.2 oz














 











 03/06/19 03/07/19 03/08/19





 06:59 06:59 06:59


 


Intake Total 1504 870 


 


Output Total 220 2100 


 


Balance 1284 -1230 














- Physical Examination


General/Neuro: alert & oriented x3


Neck: no JVD present


Lungs: CTA


Heart: RRR


Abdomen: soft


Extremities: other: (No edema)





- Telemetry


Telemetry Rhythm: SR 





- Labs


Result Diagrams: 


 03/07/19 05:48





 03/07/19 05:48


 Troponin/CKMB











CK-MB (CK-2)  5.6 ng/mL (0-6.6)   03/04/19  12:27    


 


Troponin I  4.284 ng/mL (< 0.028)  H*  03/05/19  04:58    














- Assessment/Plan





1. Acute on chronic diastolic HF - stable with Lasix 40mg IV daily; on 

Bblocker. Not on ACE/ARB 2/2 hx of CKD.


2. Elevated trop - possible 2/2 demand ischemia; Echo is ordered; Asymptomatic


3. Parox Afib - remains in SR; on Multaq and BBlocker;On Eliquis 2.5mg BID. 


4. HTN - Increase Hydralazine from 25mg ot 50mg TID from this AM. 


5. 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

, and statin


6. CKD stage 4 - cont. to monitor


7. DM type 2 - 


8. Anemia - unchanged


9. hx of seizure disorder - 


MAR reviewed


 


* Family agreed for no cardiac cath even if this is a small NSTEMI.





Pt. seen and eval. b me. I agree th the A/P by the N. If the BP is too low on 

the hydralazine then decrease and add nitrates at a low dose.


Chest- clear. RRR, no edema, CIE's trending down.. Continue medical treatment. 

gjm





Review of Systems





- Review of Systems


Constitutional: reports: weakness


EENTM: reports: no symptoms reported


Respiratory: reports: no symptoms reported


Cardiac (ROS): reports: no symptoms reported


ABD/GI: reports: no symptoms reported


: reports: no symptoms reported

## 2019-03-08 LAB
ANION GAP SERPL CALC-SCNC: 17 MMOL/L (ref 10–20)
BASOPHILS # BLD AUTO: 0 THOU/UL (ref 0–0.2)
BASOPHILS NFR BLD AUTO: 0.7 % (ref 0–1)
BUN SERPL-MCNC: 32 MG/DL (ref 9.8–20.1)
CALCIUM SERPL-MCNC: 8.3 MG/DL (ref 7.8–10.44)
CHLORIDE SERPL-SCNC: 102 MMOL/L (ref 98–107)
CO2 SERPL-SCNC: 21 MMOL/L (ref 23–31)
CREAT CL PREDICTED SERPL C-G-VRATE: 29 ML/MIN (ref 70–130)
EOSINOPHIL # BLD AUTO: 0.4 THOU/UL (ref 0–0.7)
EOSINOPHIL NFR BLD AUTO: 6.5 % (ref 0–10)
GLUCOSE SERPL-MCNC: 264 MG/DL (ref 83–110)
HGB BLD-MCNC: 9.5 G/DL (ref 12–16)
LYMPHOCYTES # BLD: 1.3 THOU/UL (ref 1.2–3.4)
LYMPHOCYTES NFR BLD AUTO: 21.7 % (ref 21–51)
MCH RBC QN AUTO: 32 PG (ref 27–31)
MCV RBC AUTO: 96.7 FL (ref 78–98)
MONOCYTES # BLD AUTO: 0.5 THOU/UL (ref 0.11–0.59)
MONOCYTES NFR BLD AUTO: 7.9 % (ref 0–10)
NEUTROPHILS # BLD AUTO: 3.8 THOU/UL (ref 1.4–6.5)
NEUTROPHILS NFR BLD AUTO: 63.3 % (ref 42–75)
PLATELET # BLD AUTO: 154 THOU/UL (ref 130–400)
POTASSIUM SERPL-SCNC: 3.7 MMOL/L (ref 3.5–5.1)
RBC # BLD AUTO: 2.96 MILL/UL (ref 4.2–5.4)
SODIUM SERPL-SCNC: 136 MMOL/L (ref 136–145)
WBC # BLD AUTO: 5.9 THOU/UL (ref 4.8–10.8)

## 2019-03-08 RX ADMIN — Medication SCH ML: at 21:10

## 2019-03-08 RX ADMIN — Medication SCH ML: at 08:39

## 2019-03-08 RX ADMIN — DOCUSATE SODIUM 50 MG AND SENNOSIDES 8.6 MG PRN TAB: 8.6; 5 TABLET, FILM COATED ORAL at 21:07

## 2019-03-08 RX ADMIN — GUAIFENESIN AND DEXTROMETHORPHAN PRN ML: 100; 10 SYRUP ORAL at 21:08

## 2019-03-08 RX ADMIN — ASPIRIN SCH MG: 81 TABLET ORAL at 08:39

## 2019-03-08 RX ADMIN — GUAIFENESIN AND DEXTROMETHORPHAN PRN ML: 100; 10 SYRUP ORAL at 08:43

## 2019-03-08 NOTE — PDOC.CTH
Cardiology Progress Note





- Subjective





The pt seen and examined.  No overnight events.  No cardiac complaints. 





- Objective


 Vital Signs











  Temp Pulse Resp BP BP Pulse Ox


 


 03/08/19 11:50  98.0 F  60  17   140/66  95


 


 03/08/19 08:33  98.0 F  74  17   155/69 H  96


 


 03/08/19 07:39       96


 


 03/08/19 07:38   61  16    96


 


 03/08/19 04:00  97.9 F  60  16  145/58 H   96








 











Weight                         192 lb 12.8 oz














 











 03/07/19 03/08/19 03/09/19





 06:59 06:59 06:59


 


Intake Total 870 920 


 


Output Total 2100 1700 


 


Balance -1230 -780 














- Physical Examination


General/Neuro: alert & oriented x3


Neck: no JVD present


Lungs: other: (diminished at bases)


Heart: RRR


Abdomen: soft


Extremities: other: (No edema)





- Telemetry


Telemetry Rhythm: SR





- Labs


Result Diagrams: 


 03/08/19 05:57





 03/08/19 09:35


 Troponin/CKMB











CK-MB (CK-2)  5.6 ng/mL (0-6.6)   03/04/19  12:27    


 


Troponin I  1.141 ng/mL (< 0.028)  H*  03/07/19  10:35    














- Assessment/Plan





1. Acute on chronic diastolic HF - stable with Lasix 40mg IV daily; on 

Bblocker. Not on ACE/ARB 2/2 hx of CKD.


2. Elevated trop - possible 2/2 demand ischemia; Last Trop went down.  

Asymptomatic


3. Parox Afib - remains in SR; on Multaq and BBlocker;On Eliquis 2.5mg BID. 


4. HTN - stable;  


5. 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

, and statin


6. CKD stage 4 - cont. to monitor


7. DM type 2 - 


8. Anemia - unchanged


9. hx of seizure disorder - 


MAR reviewed


 


* Family agreed for no cardiac cath even if this is a small NSTEMI.


* If the BP is too low on the hydralazine then decrease and add nitrates at a 

low dose.





Pt. seen and eval. by me. I agree with the A/P by the NP. The BP is better 

controlled and she has no complaints. Chest clear. RRR. Continue present meds. 

I will sign off. If any new cardiac problems then please page cardiology again.





Review of Systems





- Review of Systems


Constitutional: reports: weakness


EENTM: reports: no symptoms reported


Respiratory: reports: no symptoms reported


Cardiac (ROS): reports: no symptoms reported


ABD/GI: reports: no symptoms reported


: reports: no symptoms reported


Musculoskeletal: reports: no symptoms reported


Skin: reports: no symptoms reported

## 2019-03-08 NOTE — PDOC.PN
- Subjective


Encounter Start Date: 03/08/19


Encounter Start Time: 19:41


Subjective: still feels weak and has phlegmn


-: no chest pain





- Objective


Resuscitation Status - Order Detail:





03/04/19 16:34


Resuscitation Status Routine 


   Resuscitation Status: DNAR: NO Resuscitation


   Discussed with: patient








MAR Reviewed: Yes


Vital Signs & Weight: 


 Vital Signs (12 hours)











  Temp Pulse Pulse Pulse Resp BP BP


 


 03/08/19 18:21   67    16  


 


 03/08/19 16:17       


 


 03/08/19 15:18  97.7 F  64    17  


 


 03/08/19 11:50  98.0 F  60    17  


 


 03/08/19 10:42    62  61   161/70 H  140/65


 


 03/08/19 08:33  98.0 F  74    17  














  BP BP Pulse Ox


 


 03/08/19 18:21    92 L


 


 03/08/19 16:17  149/65 H  


 


 03/08/19 15:18  162/70 H   93 L


 


 03/08/19 11:50   140/66  95


 


 03/08/19 10:42   


 


 03/08/19 08:33   155/69 H  96








 Weight











Weight                         192 lb 12.8 oz














I&O: 


 











 03/07/19 03/08/19 03/09/19





 06:59 06:59 06:59


 


Intake Total 870 920 


 


Output Total 2100 1700 


 


Balance -1230 -780 











Result Diagrams: 


 03/08/19 05:57





 03/08/19 09:35


Additional Labs: 


 Accuchecks











  03/08/19 03/08/19 03/08/19





  16:31 14:57 10:50


 


POC Glucose  158 H  193 H  305 H














  03/08/19 03/07/19





  06:02 20:34


 


POC Glucose  159 H  189 H








 Laboratory Tests











  02/07/19 02/07/19 02/07/19





  12:55 18:27 23:06


 


Troponin I  0.162 H  1.204 H*  1.713 H*


 


B-Natriuretic Peptide   














  02/09/19 03/04/19 03/04/19





  02:48 08:24 08:24


 


Troponin I  0.660 H*   0.032 H


 


B-Natriuretic Peptide   903.1 H 














  03/04/19 03/04/19 03/05/19





  12:27 15:08 04:58


 


Troponin I  0.286 H  1.150 H*  4.284 H*


 


B-Natriuretic Peptide   














  03/07/19





  10:35


 


Troponin I  1.141 H*


 


B-Natriuretic Peptide 














Phys Exam





- Physical Examination


Constitutional: NAD


HEENT: PERRLA, moist MMs, sclera anicteric, TM's clear, oral pharynx no lesions

, 2+ tonsils


Neck: no nodes, no JVD, supple, full ROM


Respiratory: no wheezing, no rhonchi, clear to auscultation bilateral


few basilar rales


Cardiovascular: RRR, no significant murmur


Gastrointestinal: soft, non-tender, no distention, positive bowel sounds


Musculoskeletal: no edema, pulses present


Neurological: non-focal, normal sensation, moves all 4 limbs


Psychiatric: normal affect, A&O x 3


Skin: no rash





Dx/Plan


(1) Acute on chronic diastolic CHF (congestive heart failure), NYHA class 3


Code(s): I50.33 - ACUTE ON CHRONIC DIASTOLIC (CONGESTIVE) HEART FAILURE   Status

: Acute   Comment: Improving   





(2) Demand ischemia


Code(s): I24.8 - OTHER FORMS OF ACUTE ISCHEMIC HEART DISEASE   Status: Acute   

Comment: no plan for cath   





(3) CKD stage 4 due to type 2 diabetes mellitus


Code(s): E11.22 - TYPE 2 DIABETES MELLITUS W DIABETIC CHRONIC KIDNEY DISEASE; 

N18.4 - CHRONIC KIDNEY DISEASE, STAGE 4 (SEVERE)   Status: Chronic   Comment: 

stable   





(4) CAD (coronary artery disease)


Code(s): I25.10 - ATHSCL HEART DISEASE OF NATIVE CORONARY ARTERY W/O ANG PCTRS 

  Status: Acute   


Qualifiers: 


   Coronary Disease-Associated Artery/Lesion type: native artery   Native vs. 

transplanted heart: native heart   Associated angina: without angina   

Qualified Code(s): I25.10 - Atherosclerotic heart disease of native coronary 

artery without angina pectoris   





(5) Physical deconditioning


Code(s): R53.81 - OTHER MALAISE   Status: Acute   Comment: therapy following   





(6) DM2 (diabetes mellitus, type 2)


Status: Chronic   Comment: continue accuchecks, insulin sliding scale   





(7) HTN (hypertension)


Code(s): I10 - ESSENTIAL (PRIMARY) HYPERTENSION   Status: Chronic   


Qualifiers: 


   Hypertension type: essential hypertension   Qualified Code(s): I10 - 

Essential (primary) hypertension   


Comment: controlled   





(8) Atrial fibrillation


Code(s): I48.91 - UNSPECIFIED ATRIAL FIBRILLATION   Status: Chronic   Comment: 

on BB and eliquis   





- Plan


PT/OT, DVT proph w/SCDs


cont diuresis IV for one more day.quoc FAUSTO home in am


-: no ace-i/arb d/t CKD


-: cont asa,BB,statin.HD stable





* .








Review of Systems





- Review of Systems


Constitutional: weakness, malaise.  negative: fever, chills, sweats, other


ENT: negative: Ear Pain, Ear Discharge, Nose Pain, Nose Discharge, Nose 

Congestion, Mouth Pain, Mouth Swelling, Throat Pain, Throat Swelling, Other


Respiratory: negative: Cough, Dry, Shortness of Breath, Hemoptysis, SOB with 

Excertion, Pleuritic Pain, Sputum, Wheezing


Cardiovascular: negative: chest pain, palpitations, orthopnea, paroxysmal 

nocturnal dyspnea, edema, light headedness, other


Gastrointestinal: negative: Nausea, Vomiting, Abdominal Pain, Diarrhea, 

Constipation, Melena, Hematochezia, Other


Genitourinary: negative: Dysuria, Frequency, Incontinence, Hematuria, Retention

, Other


Musculoskeletal: negative: Neck Pain, Shoulder Pain, Arm Pain, Back Pain, Hand 

Pain, Leg Pain, Foot Pain, Other


Neurological: negative: Weakness, Numbness, Incoordination, Change in Speech, 

Confusion, Seizures, Other





- Medications/Allergies


Allergies/Adverse Reactions: 


 Allergies











Allergy/AdvReac Type Severity Reaction Status Date / Time


 


codeine Allergy Intermediate  Verified 03/05/19 06:56


 


Penicillins Allergy Intermediate  Verified 03/05/19 06:56


 


Sulfa (Sulfonamide Allergy Intermediate  Verified 03/05/19 06:56





Antibiotics)     


 


ciprofloxacin [From Cipro] Allergy   Verified 03/05/19 06:56


 


phenytoin [From Dilantin] Allergy   Verified 03/05/19 06:56


 


vancomycin Allergy  Hives Verified 03/05/19 06:56











Medications: 


 Current Medications





Acetaminophen (Tylenol)  650 mg PO Q4H PRN


   PRN Reason: Headache/Fever/Mild Pain (1-3)


Albuterol/Ipratropium (Duoneb)  3 ml NEB BID-RT [facility]


   Last Admin: 03/08/19 18:21 Dose:  3 ml


Amlodipine Besylate (Norvasc)  5 mg PO DAILY [facility]


   Last Admin: 03/08/19 08:39 Dose:  5 mg


Apixaban (Eliquis)  2.5 mg PO BID [facility]


   Last Admin: 03/08/19 08:39 Dose:  2.5 mg


Aspirin (Ecotrin)  81 mg PO DAILY [facility]


   Last Admin: 03/08/19 08:39 Dose:  81 mg


Dextrose/Water (Dextrose 50%)  25 gm IVP PRN PRN


   PRN Reason: HYPOGLYCEMIA PROTOCOL


Diphenhydramine HCl (Benadryl)  25 mg PO Q6H PRN


   PRN Reason: Itching


   Last Admin: 03/06/19 21:23 Dose:  25 mg


Dronedarone (Multaq)  400 mg PO BID-[facility]


   Last Admin: 03/08/19 16:15 Dose:  400 mg


Escitalopram Oxalate (Lexapro)  10 mg PO DAILY [facility]


   Last Admin: 03/08/19 08:38 Dose:  10 mg


Furosemide (Lasix)  40 mg SLOW IVP DAILY [facility]


   Last Admin: 03/08/19 08:39 Dose:  40 mg


Glucagon (Glucagon)  1 mg IM PRN PRN


   PRN Reason: HYPOGLYCEMIA PROTOCOL


Glyburide (Micronase)  2.5 mg PO QAM-[facility]


   Last Admin: 03/08/19 08:38 Dose:  2.5 mg


Guaifenesin/Dextromethorphan (Robitussin Dm)  15 ml PO Q4H PRN


   PRN Reason: Cough


   Last Admin: 03/08/19 08:43 Dose:  15 ml


Hydralazine HCl (Apresoline)  5 mg SLOW IVP Q4H PRN


   PRN Reason: SBP GREATER THAN 160


Hydralazine HCl (Apresoline)  50 mg PO TID [facility]


   Last Admin: 03/08/19 14:44 Dose:  50 mg


Dextrose/Water (D5w)  1,000 mls @ 0 mls/hr IV INF PRN


   PRN Reason: HYPOGLYCEMIA PROTOCOL


Insulin Human Lispro (Humalog)  0 units SC .MODERATE SLIDING SC PRN; Protocol


   PRN Reason: MODERATE SLIDING SCALE


Insulin Human Lispro (Humalog)  0 units SC .BEDTIME SLIDING SC PRN; Protocol


   PRN Reason: BEDTIME SLIDING SCALE


Isosorbide Dinitrate (Isordil)  10 mg PO BID [facility]


   Last Admin: 03/08/19 08:38 Dose:  10 mg


Labetalol HCl (Normodyne)  10 mg SLOW IVP Q4H PRN


   PRN Reason: SBP Greater Than 180


Latanoprost (Xalatan 0.005% Ophth Soln)  1 drop EA EYE [facility]


   Last Admin: 03/07/19 22:14 Dose:  1 drop


Melatonin (Melatonin)  3 mg PO HS PRN


   PRN Reason: Insomnia


Metoprolol Tartrate (Lopressor)  100 mg PO BID [facility]


   Last Admin: 03/08/19 08:39 Dose:  100 mg


Ondansetron HCl (Zofran)  4 mg IVP Q6H PRN


   PRN Reason: Nausea/Vomiting


Phenobarbital (Phenobarbital)  64.8 mg PO BID [facility]


   Last Admin: 03/08/19 09:41 Dose:  64.8 mg


Rosuvastatin Calcium (Crestor)  40 mg PO HS [facility]


   Last Admin: 03/07/19 22:08 Dose:  40 mg


Senna/Docusate Sodium (Senokot S)  2 tab PO BID PRN


   PRN Reason: Constipation


   Last Admin: 03/05/19 00:50 Dose:  2 tab


Sodium Chloride (Flush - Normal Saline)  10 ml IVF Q12HR [facility]


   Last Admin: 03/08/19 08:39 Dose:  10 ml


Sodium Chloride (Flush - Normal Saline)  10 ml IVF PRN PRN


   PRN Reason: Saline Flush

## 2019-03-09 VITALS — DIASTOLIC BLOOD PRESSURE: 68 MMHG | SYSTOLIC BLOOD PRESSURE: 157 MMHG

## 2019-03-09 VITALS — TEMPERATURE: 97.8 F

## 2019-03-09 LAB
ANION GAP SERPL CALC-SCNC: 13 MMOL/L (ref 10–20)
BASOPHILS # BLD AUTO: 0 THOU/UL (ref 0–0.2)
BASOPHILS NFR BLD AUTO: 0.1 % (ref 0–1)
BUN SERPL-MCNC: 34 MG/DL (ref 9.8–20.1)
CALCIUM SERPL-MCNC: 8.2 MG/DL (ref 7.8–10.44)
CHLORIDE SERPL-SCNC: 102 MMOL/L (ref 98–107)
CO2 SERPL-SCNC: 24 MMOL/L (ref 23–31)
CREAT CL PREDICTED SERPL C-G-VRATE: 32 ML/MIN (ref 70–130)
EOSINOPHIL # BLD AUTO: 0.4 THOU/UL (ref 0–0.7)
EOSINOPHIL NFR BLD AUTO: 6.9 % (ref 0–10)
GLUCOSE SERPL-MCNC: 148 MG/DL (ref 83–110)
HGB BLD-MCNC: 9.4 G/DL (ref 12–16)
LYMPHOCYTES # BLD: 1.1 THOU/UL (ref 1.2–3.4)
LYMPHOCYTES NFR BLD AUTO: 20.1 % (ref 21–51)
MCH RBC QN AUTO: 31.6 PG (ref 27–31)
MCV RBC AUTO: 96.1 FL (ref 78–98)
MONOCYTES # BLD AUTO: 0.4 THOU/UL (ref 0.11–0.59)
MONOCYTES NFR BLD AUTO: 7.7 % (ref 0–10)
NEUTROPHILS # BLD AUTO: 3.7 THOU/UL (ref 1.4–6.5)
NEUTROPHILS NFR BLD AUTO: 65.2 % (ref 42–75)
PLATELET # BLD AUTO: 164 THOU/UL (ref 130–400)
POTASSIUM SERPL-SCNC: 4.1 MMOL/L (ref 3.5–5.1)
RBC # BLD AUTO: 2.98 MILL/UL (ref 4.2–5.4)
SODIUM SERPL-SCNC: 135 MMOL/L (ref 136–145)
WBC # BLD AUTO: 5.6 THOU/UL (ref 4.8–10.8)

## 2019-03-09 RX ADMIN — Medication SCH ML: at 09:32

## 2019-03-09 RX ADMIN — GUAIFENESIN AND DEXTROMETHORPHAN PRN ML: 100; 10 SYRUP ORAL at 09:02

## 2019-03-09 RX ADMIN — ASPIRIN SCH MG: 81 TABLET ORAL at 09:05

## 2019-03-09 RX ADMIN — GUAIFENESIN AND DEXTROMETHORPHAN PRN ML: 100; 10 SYRUP ORAL at 14:39

## 2019-03-09 NOTE — EKG
Test Reason : CHF

Blood Pressure : ***/*** mmHG

Vent. Rate : 099 BPM     Atrial Rate : 099 BPM

   P-R Int : 206 ms          QRS Dur : 094 ms

    QT Int : 322 ms       P-R-T Axes : 035 045 111 degrees

   QTc Int : 413 ms

 

Normal sinus rhythm

Marked ST abnormality, possible lateral subendocardial injury

Abnormal ECG

 

Confirmed by ALBERTO CLARK, CATHERINE ([id number]),  DELL GIBBS ([id number]) on 3/9/2019 11:35:06 PM

 

Referred By:             Confirmed By:CATHERINE DOMINGUEZ MD
[id number]

## 2019-03-11 NOTE — DIS
DATE OF ADMISSION:  03/04/2019



DATE OF DISCHARGE:  03/09/2019



PRIMARY CARE PROVIDER:  Crispin Powell M.D.



DISCHARGE DIAGNOSES:  

1. Acute-on-chronic diastolic congestive heart failure, [place] Heart Association

class C, and American College of Cardiology/American Heart Association, class C. 

2. Non-ST elevation myocardial infarction, type 2 secondary to demand ischemia.



CONDITION OF PATIENT ON THE DAY OF DISCHARGE:  Stable.  I assessed Ms. Arthur on

the day of discharge. 



PHYSICAL EXAMINATION:

VITAL SIGNS:  Stable. 

CARDIAC:  She denies any chest pain or shortness of breath.  S1 and S2 are heard,

regular. 

LUNGS:  Clear to auscultation bilaterally.



DISCHARGE MEDICATIONS:  

1. Aspirin 81 mg daily.

2. Lexapro 10 mg daily.  

3. Toviaz 8 mg daily.

4. Latanoprost eyedrops 1 drop to each eye in the evening.  

5. Phenobarbital 64 mg 2 times daily.  

6. Crestor 40 mg daily.

7. Apixaban 2.5 mg 2 times daily.

8. Multaq 400 mg 2 times daily.

9. Furosemide 40 mg daily.

10. Glyburide 2.5 mg daily.

11. Hydralazine 50 mg 3 times daily.

12. Isosorbide dinitrate 10 mg 2 times daily.

13. Lopressor 100 mg 2 times daily.

14. Melatonin 3 mg at bedtime as needed.



CONSULTATIONS DURING THIS HOSPITALIZATION:  Cardiology, Dr. Victoria.



HOSPITAL COURSE:  Ms. Arthur is a pleasant 85-year-old lady, who was admitted to

[facility] on March 4, 2019, for congestive heart failure

exacerbation.  Please refer to Dr. Zuluaga's history and physical note dated March 4, 2019, for further details. 



She was seen by Cardiology Service.  She improved with intravenous diuretics.  She

also had elevated troponin, most likely secondary to demand ischemia causing non ST

elevation myocardial infarction type 2. 



A 2D echocardiogram showed left ventricular ejection fraction of 55% to 60%, E/A

flow reversal suggestive of diastolic dysfunction, left atrium moderately to

severely dilated, mildly enlarged right atrium size, mild annular calcification,

mild mitral regurgitation, tricuspid aortic valve, mild-to-moderate tricuspid

regurgitation, and moderate pulmonic regurgitation. 



She continued to improve clinically.  She is being discharged back to [facility]

[facility], from where she was admitted to the hospital. 



LABORATORY DATA:  On the day of discharge, she has white count 5600, hemoglobin 9.4,

platelet count 164,000.  Sodium 135, potassium 4.1, blood urea nitrogen 34 and

creatinine 1.77.  Her peak troponin during this hospitalization was 4.284 on March 5, 2019. 



Please note that Cardiology Service discussed with the patient and her daughter

regarding the possibility of cardiac catheterization.  They did not wish to have any

invasive procedures at this time. 



DISCHARGE DESTINATION:  [facility].



TOTAL AMOUNT OF TIME SPENT COORDINATING THIS DISCHARGE:  32 minutes.





Job ID:  [id number]